# Patient Record
Sex: MALE | Race: OTHER
[De-identification: names, ages, dates, MRNs, and addresses within clinical notes are randomized per-mention and may not be internally consistent; named-entity substitution may affect disease eponyms.]

---

## 2018-02-27 ENCOUNTER — HOSPITAL ENCOUNTER (EMERGENCY)
Dept: HOSPITAL 62 - ER | Age: 23
LOS: 1 days | Discharge: HOME | End: 2018-02-28
Payer: OTHER GOVERNMENT

## 2018-02-27 VITALS — DIASTOLIC BLOOD PRESSURE: 75 MMHG | SYSTOLIC BLOOD PRESSURE: 124 MMHG

## 2018-02-27 DIAGNOSIS — F17.210: ICD-10-CM

## 2018-02-27 DIAGNOSIS — J02.9: Primary | ICD-10-CM

## 2018-02-27 DIAGNOSIS — J35.8: ICD-10-CM

## 2018-02-27 DIAGNOSIS — Z71.6: ICD-10-CM

## 2018-02-27 PROCEDURE — 99283 EMERGENCY DEPT VISIT LOW MDM: CPT

## 2018-02-27 PROCEDURE — 70360 X-RAY EXAM OF NECK: CPT

## 2018-02-27 NOTE — ER DOCUMENT REPORT
ED Foreign Body





- General


Chief Complaint: Possible FB in throat


Stated Complaint: THROAT PAIN


Time Seen by Provider: 02/27/18 23:14


Mode of Arrival: Ambulatory


Information source: Patient


Notes: 





22-year-old male presented to ED for complaint of possible fishbone in his 

throat.  He states he was eaten salmon and he saw a fishbone in his throat.  

States he is continuing to have pain in his throat.


TRAVEL OUTSIDE OF THE [place] IN LAST 30 DAYS: No





- HPI


Location of foreign body: Throat


Onset: Other - This evening


Onset/Duration: Persistent


Quality of pain: Sharp


Severity: Moderate


Pain Level: 3


Associated symptoms: Other - Sore throat states he can see a fishbone in his 

throat


Exacerbated by: Other - Swallowing


Relieved by: Denies


Similar symptoms previously: No


Recently seen / treated by doctor: No





- Related Data


Allergies/Adverse Reactions: 


 





No Known Allergies Allergy (Unverified 02/27/18 21:59)


 











Past Medical History





- General


Information source: Patient





- Social History


Smoking Status: Current Every Day Smoker


Cigarette use (# per day): Yes - 4 cigarettes a day


Chew tobacco use (# tins/day): No


Smoking Education Provided: Yes - 4 minutes


Frequency of alcohol use: None


Drug Abuse: None


Occupation: 


Lives with: Spouse/Significant other - Male significant other states that he is 

his 


Family History: Arthritis, CAD, CVA, DM, Hyperlipidemia, Hypertension, 

Malignancy.  denies: COPD, Thyroid Disfunction


Patient has suicidal ideation: No


Patient has homicidal ideation: No





- Past Medical History


Cardiac Medical History: Reports: None


Pulmonary Medical History: Reports: None


EENT Medical History: Reports: None


Neurological Medical History: Reports: None


Endocrine Medical History: Reports: None


Renal/ Medical History: Reports: None


Malignancy Medical History: Reports None


GI Medical History: Reports: None


Musculoskeltal Medical History: Reports None


Skin Medical History: Reports None


Psychiatric Medical History: Reports: None


Traumatic Medical History: Reports: None


Infectious Medical History: Reports: None


Past Surgical History: Reports: Hx Appendectomy - 2015





- Immunizations


Immunizations up to date: Yes


Hx Diphtheria, Pertussis, Tetanus Vaccination: Yes





Review of Systems





- Review of Systems


Notes: 





Constitutional: [PRESENT: as per HPI.  ABSENT: chills, fever(s), headache(s), 

weight gain, weight loss]


Eyes: [ABSENT: visual disturbances]


Ears: [ABSENT: hearing changes]


Nasopharyngeal: States he feels and can see of fishbone in the right side of 

his throat just above his tonsils.  States he was eaten salmon felt a shortness 

in his throat.


Cardiovascular: [ABSENT: chest pain, dyspnea on exertion, edema, orthropnea, 

palpitations]


Respiratory: [ABSENT: cough, hemoptysis]


Gastrointestinal: [ABSENT: abdominal pain, constipation, diarrhea, hematemesis, 

hematochezia, nausea, vomiting]


Genitourinary: [ABSENT: dysuria, hematuria]


Musculoskeletal: [ABSENT: joint swelling]


Integumentary: [ABSENT: rash, wounds]


Neurological: [ABSENT: abnormal gait, abnormal speech, confusion, dizziness, 

focal weakness, syncope]


Psychiatric: [ABSENT: anxiety, depression, homicidal ideation, suicidal ideation

]


Endocrine: [ABSENT: cold intolerance, heat intolerance, menstrual abnormalities

, polydipsia, polyuria]


Hematologic/Lymphatic: [ABSENT: easy bleeding, easy bruising, lymphadenopathy]





Physical Exam





- Vital signs


Vitals: 


 











Temp Pulse Resp BP Pulse Ox


 


 97.4 F   67   16   124/75   98 


 


 02/27/18 22:02  02/27/18 22:02  02/27/18 22:02  02/27/18 22:02  02/27/18 22:02














- Notes


Notes: 





PHYSICAL EXAMINATION:





GENERAL: Well-appearing, well-nourished and in no acute distress.





HEAD: Atraumatic, normocephalic.





EYES: Pupils equal round and reactive to light, extraocular movements intact, 

sclera anicteric, conjunctiva are normal.





ENT: Nares patent, small tonsillar stone on the right tonsil  without exudates. 

No foreign bodies noted.  Patient states there is a bone.  Moist mucous 

membranes.





NECK: Normal range of motion, supple without lymphadenopathy





LUNGS: Breath sounds clear to auscultation bilaterally and equal.  No wheezes 

rales or rhonchi.





HEART: Regular rate and rhythm without murmurs





ABDOMEN: Soft, nontender, nondistended abdomen.  No guarding, no rebound.  No 

masses appreciated.





Musculoskeletal: Normal range of motion, no pitting or edema.  No cyanosis.





NEUROLOGICAL: Cranial nerves grossly intact.  Normal speech, normal gait.  

Normal sensory, motor exams 





PSYCH: Normal mood, normal affect.





SKIN: Warm, Dry, normal turgor, no rashes or lesions noted.





Course





- Re-evaluation


Re-evalutation: 





02/28/18 01:49


Soft tissue neck negative for any foreign bodies.  When patient showed me where 

he thinks there is a bone.  It was a tonsillar stone.  I showed patient that it 

would come out if there is a stone.  Instructed patient on use of salt and soda 

gargles and mouthwash to help reduce tonsillar stones.





- Vital Signs


Vital signs: 


 











Temp Pulse Resp BP Pulse Ox


 


 97.4 F   67   16   124/75   98 


 


 02/27/18 22:02  02/27/18 22:02  02/27/18 22:02  02/27/18 22:02  02/27/18 22:02














- Diagnostic Test


Radiology reviewed: Image reviewed, Reports reviewed





Discharge





- Discharge


Clinical Impression: 


 Sore throat, felt like foreign body none seen





Condition: Stable


Disposition: HOME, SELF-CARE


Instructions:  Dentist, Family Physicians / Practices


Additional Instructions: 


SORE THROAT:





     Sore throats may be caused by viruses, bacteria, or fungi.  Most are due 

to a virus, and must get better on their own.  Bacterial sore throats, 

particularly those due to "strep," need treatment with antibiotics.


     If an antibiotic is prescribed, be sure to take the medication for a full 

10 days.  Failure to take the antibiotic can result in complications such as 

rheumatic fever.  Sometimes, an injection of antibiotics is given instead of 

pills or liquid.  This single "shot" is equal in effectiveness to the oral 

medication.


     To relieve symptoms, take acetaminophen for pain.  Sip clear liquids 

frequently, or eat popsicles or ice chips.  Anesthetic sprays or lozenges may 

help.  Make sure the air in the room is not too dry. Avoid using decongestants 

or antihistamines.


     Call the doctor if there is no improvement in two days, or if you have 

difficulty breathing, increasing throat pain, high fever, rash, or frequent 

vomiting.





Tonsilloliths, also known as tonsil stones, are soft aggregates of bacterial 

and cellular debris that form in the tonsillar crypts, the crevices of the 

tonsils.While they occur most commonly in the palatine tonsils, they may also 

occur in the lingual tonsils. Tonsil stones are very common.





FOLLOW-UP CARE:


If you have been referred to a physician for follow-up care, call the physician

s office for an appointment as you were instructed or within the next two days.

  If you experience worsening or a significant change in your symptoms, notify 

the physician immediately or return to the Emergency Department at any time for 

re-evaluation.





Forms:  Smoking Cessation Education, Return to Work

## 2018-02-27 NOTE — RADIOLOGY REPORT (SQ)
EXAM DESCRIPTION: 



SOFT TISSUE NECK



CLINICAL HISTORY: 



22 years, Male, poss bone stuck in throat



COMPARISON:

None.



NUMBER OF VIEWS:

2



LIMITATIONS:

None.



FINDINGS:



Patent nasopharynx and airway. No radiopaque foreign body

discerned. Normal prevertebral soft tissues. Else, unremarkable.



IMPRESSION:



No acute findings.

## 2018-03-04 ENCOUNTER — HOSPITAL ENCOUNTER (EMERGENCY)
Dept: HOSPITAL 62 - ER | Age: 23
Discharge: HOME | End: 2018-03-04
Payer: OTHER GOVERNMENT

## 2018-03-04 VITALS — DIASTOLIC BLOOD PRESSURE: 71 MMHG | SYSTOLIC BLOOD PRESSURE: 121 MMHG

## 2018-03-04 DIAGNOSIS — J02.9: Primary | ICD-10-CM

## 2018-03-04 DIAGNOSIS — R51: ICD-10-CM

## 2018-03-04 DIAGNOSIS — J34.89: ICD-10-CM

## 2018-03-04 DIAGNOSIS — J06.9: ICD-10-CM

## 2018-03-04 PROCEDURE — 87070 CULTURE OTHR SPECIMN AEROBIC: CPT

## 2018-03-04 PROCEDURE — 99283 EMERGENCY DEPT VISIT LOW MDM: CPT

## 2018-03-04 PROCEDURE — 87077 CULTURE AEROBIC IDENTIFY: CPT

## 2018-03-04 PROCEDURE — 87880 STREP A ASSAY W/OPTIC: CPT

## 2018-03-04 NOTE — ER DOCUMENT REPORT
HPI





- HPI


Onset: Yesterday


Onset/Duration: Gradual


Quality of pain: Achy


Pain Level: 2


Context: 





Patient presents complaining of sore throat, headache and congestion that 

started yesterday.  Patient denies any fever or cough.


Associated Symptoms: Headache, Rhinnorhea, Sore throat.  denies: Nonproductive 

cough, Fever, Vomiting


Exacerbated by: Denies


Relieved by: Denies


Similar symptoms previously: Yes


Recently seen / treated by doctor: No





- ROS


ROS below otherwise negative: Yes


Systems Reviewed and Negative: Yes All other systems reviewed and negative





- CONSTITUTIONAL


Constitutional: DENIES: Fever





- EENT


EENT: REPORTS: Sore Throat, Nasal Drainage-Clear, Congestion





- NEURO


Neurology: REPORTS: Headache





- RESPIRATORY


Respiratory: DENIES: Trouble Breathing, Coughing





- GASTROINTESTINAL


Gastrointestinal: DENIES: Nausea, Patient vomiting, Diarrhea





- DERM


Skin Color: Normal


Skin Problems: None





Past Medical History





- General


Information source: Patient





- Social History


Smoking Status: Never Smoker


Frequency of alcohol use: None


Drug Abuse: None


Occupation: Foodservice


Family History: Arthritis, CAD, CVA, DM, Hyperlipidemia, Hypertension, 

Malignancy.  denies: COPD, Thyroid Disfunction





- Medical History


Medical History: Negative


Renal/ Medical History: Denies: Hx Peritoneal Dialysis


Past Surgical History: Reports: Hx Appendectomy - 2015





- Immunizations


Immunizations up to date: Yes


Hx Diphtheria, Pertussis, Tetanus Vaccination: Yes





Vertical Provider Document





- CONSTITUTIONAL


Agree With Documented VS: Yes


Exam Limitations: No Limitations


General Appearance: WD/WN, No Apparent Distress





- INFECTION CONTROL


TRAVEL OUTSIDE OF THE [place] IN LAST 30 DAYS: No





- HEENT


HEENT: Atraumatic, Normocephalic, Pharyngeal Tenderness, Pharyngeal Erythema.  

negative: Pharyngeal Exudate, Tympanic Membrane Red, Tympanic Membrane Bulging





- NECK


Neck: Normal Inspection, Supple.  negative: Lymphadenopathy-Left, 

Lymphadenopathy-Right





- RESPIRATORY


Respiratory: Breath Sounds Normal, No Respiratory Distress


O2 Sat by Pulse Oximetry: 99





- CARDIOVASCULAR


Cardiovascular: Regular Rate, Regular Rhythm, No Murmur





- BACK


Back: Normal Inspection





- MUSCULOSKELETAL/EXTREMETIES


Musculoskeletal/Extremeties: MAEW, FROM





- NEURO


Level of Consciousness: Awake, Alert, Appropriate


Motor/Sensory: No Motor Deficit





- DERM


Integumentary: Warm, Dry, No Rash





Course





- Re-evaluation


Re-evalutation: 





03/04/18 12:33


Patient presents with URI symptoms, no concern for sinusitis given onset of 

symptoms just after midnight.  No concern for pneumonia.  Will treat 

symptomatically.  Good return precautions provided





- Vital Signs


Vital signs: 


 











Temp Pulse Resp BP Pulse Ox


 


 98.0 F   76   17   124/75   99 


 


 03/04/18 11:15  03/04/18 11:15  03/04/18 11:15  03/04/18 11:15  03/04/18 11:15














- Laboratory


Laboratory results interpreted by me: 





03/04/18 12:33


 Labs- Entire Visit











  03/04/18





  11:37


 


Group A Strep Rapid  NEGATIVE














Discharge





- Discharge


Clinical Impression: 


 Sore throat





Upper respiratory infection


Qualifiers:


 URI type: unspecified URI Qualified Code(s): J06.9 - Acute upper respiratory 

infection, unspecified





Condition: Stable


Disposition: HOME, SELF-CARE


Instructions:  Sore Throat ([facility]), Upper Respiratory Illness ([facility])


Additional Instructions: 


Return immediately for any new or worsening symptoms





Followup with your primary care provider, call tomorrow to make a followup 

appointment





Throat culture is pending, we will call if you need any different treatment


Prescriptions: 


Guaifenesin/Pseudoephedrne HCl [Mucinex D ER Tablet] 1 each PO Q12 PRN #12 

tab.er.12h


 PRN Reason: 


Naproxen [Naprosyn 250 Nmg Tablet] 1 tab PO BID #14 tablet


Forms:  Return to Work


Referrals: 


[facility] [Provider Group] - Follow up as needed